# Patient Record
Sex: MALE | Race: BLACK OR AFRICAN AMERICAN | NOT HISPANIC OR LATINO | Employment: UNEMPLOYED | URBAN - METROPOLITAN AREA
[De-identification: names, ages, dates, MRNs, and addresses within clinical notes are randomized per-mention and may not be internally consistent; named-entity substitution may affect disease eponyms.]

---

## 2019-01-22 ENCOUNTER — NURSE TRIAGE (OUTPATIENT)
Dept: PHYSICAL THERAPY | Facility: OTHER | Age: 39
End: 2019-01-22

## 2019-01-22 VITALS
WEIGHT: 165 LBS | HEART RATE: 80 BPM | BODY MASS INDEX: 24.44 KG/M2 | DIASTOLIC BLOOD PRESSURE: 82 MMHG | SYSTOLIC BLOOD PRESSURE: 144 MMHG | HEIGHT: 69 IN

## 2019-01-22 DIAGNOSIS — M54.50 CHRONIC MIDLINE LOW BACK PAIN WITHOUT SCIATICA: Primary | ICD-10-CM

## 2019-01-22 DIAGNOSIS — G89.29 CHRONIC MIDLINE LOW BACK PAIN WITHOUT SCIATICA: Primary | ICD-10-CM

## 2019-01-22 DIAGNOSIS — M54.50 ACUTE BILATERAL LOW BACK PAIN WITHOUT SCIATICA: Primary | ICD-10-CM

## 2019-01-22 PROCEDURE — 99203 OFFICE O/P NEW LOW 30 MIN: CPT | Performed by: ORTHOPAEDIC SURGERY

## 2019-01-22 NOTE — TELEPHONE ENCOUNTER
Reason for Disposition   Is this a chronic condition? Background - Initial Assessment  Clinical complaint: Lower back pain  Date of onset: 6 years overall, this flare up 1 week  Mechanism of injury: Unknown onset of injury  Patient noticed overtime increasing pain with position changes  Flare up this week    Previous Treatment - Previous Treatment  Previous evaluation: Dr Hermelinda Black[facility] orthopedics  Current provider: Just moved - advised to call [address] [place] [address] link to help locate a pcp  Diagnostics: No recent imaging  Previous treatment: No past therapy or intervention  Patient denies pain medication    Protocols used: [facility] COMPREHENSIVE SPINE PROGRAM PROTOCOL    RN provided patient an overview of the [facility] Program including: triage assessment, physical therapy, and additional specialist referral options based on symptoms and chronicity  RN explained that Comprehensive Spine program is physical therapy based with the goal of getting the patient scheduled in 24 - 48 hrs  Further explained that we schedule patients for a consultation with one of our advanced spine physical therapists for evaluation which may include treatment  These therapists have their doctorate in PTx  If needed, a telemed visit could occur at the same time of this consultation if muscle relaxers or a higher dose NSAID is needed  The goal is to get patients treated as quickly as possible so they can return to their normal activities  Research has shown great results when starting physical therapy sooner than later which helps reduce imaging and costs to patients  Discussed options for both PT and Physiatry with Patient  Patient would like to start with PT only first and be referred from there only if necessary

## 2019-01-22 NOTE — PROGRESS NOTES
Assessment/Plan:  1  Chronic midline low back pain without sciatica       Regi Miranda has 1 week of low back pain without radicular symptoms  I did give him the referral to the [facility] to begin physical therapy and further workup if necessary  He may follow up with me as needed  Subjective:   Joel Jorge is a 34 y o  male who presents for evaluation for low back pain  He describes chronic intermittent low back pain for several years but it seems to be increased over the past 1 week  He feels aching and throbbing discomfort across the low back  He denies any radiating pain down the legs  He denies any numbness or weakness  He denies any fall or lifting injury  He states the pain worsens when sitting in the car or standing for peer to time  He is not taking any medication for the pain  Review of Systems   Constitutional: Negative for chills, fever and unexpected weight change  HENT: Negative for hearing loss, nosebleeds and sore throat  Eyes: Negative for pain, redness and visual disturbance  Respiratory: Negative for cough, shortness of breath and wheezing  Cardiovascular: Negative for chest pain, palpitations and leg swelling  Gastrointestinal: Negative for abdominal pain, nausea and vomiting  Endocrine: Negative for polyphagia and polyuria  Genitourinary: Negative for dysuria and hematuria  Musculoskeletal:        See HPI   Skin: Negative for rash and wound  Neurological: Negative for dizziness, numbness and headaches  Psychiatric/Behavioral: Negative for decreased concentration and suicidal ideas  The patient is not nervous/anxious  No past medical history on file  No past surgical history on file  No family history on file  Social History     Occupational History    Not on file       Social History Main Topics    Smoking status: Never Smoker    Smokeless tobacco: Never Used    Alcohol use No    Drug use: No    Sexual activity: Not on file No current outpatient prescriptions on file  No Known Allergies    Objective:  Vitals:    01/22/19 1401   BP: 144/82   Pulse: 80       Back Exam     Tenderness   Back tenderness location: Bilateral lumbar paraspinal tenderness  No midline tenderness  Range of Motion   The patient has normal back ROM  Muscle Strength   Right Quadriceps:  5/5   Left Quadriceps:  5/5   Right Hamstrings:  5/5   Left Hamstrings:  5/5     Tests   Straight leg raise right: negative  Straight leg raise left: negative    Reflexes   Patellar: normal    Other   Toe Walk: normal  Heel Walk: normal  Sensation: normal  Gait: normal   Erythema: no back redness            Physical Exam   Constitutional: He is oriented to person, place, and time  He appears well-developed and well-nourished  HENT:   Head: Normocephalic and atraumatic  Eyes: Pupils are equal, round, and reactive to light  Conjunctivae are normal    Neck: Normal range of motion  Neck supple  Cardiovascular: Normal rate and intact distal pulses  Pulmonary/Chest: Effort normal  No respiratory distress  Musculoskeletal:   As noted in HPI   Neurological: He is alert and oriented to person, place, and time  Skin: Skin is warm and dry  Psychiatric: He has a normal mood and affect  His behavior is normal    Vitals reviewed

## 2019-01-24 ENCOUNTER — EVALUATION (OUTPATIENT)
Dept: PHYSICAL THERAPY | Facility: CLINIC | Age: 39
End: 2019-01-24
Payer: COMMERCIAL

## 2019-01-24 VITALS — TEMPERATURE: 98.5 F | HEART RATE: 66 BPM | SYSTOLIC BLOOD PRESSURE: 135 MMHG | DIASTOLIC BLOOD PRESSURE: 90 MMHG

## 2019-01-24 DIAGNOSIS — M54.50 ACUTE BILATERAL LOW BACK PAIN WITHOUT SCIATICA: Primary | ICD-10-CM

## 2019-01-24 PROCEDURE — 97161 PT EVAL LOW COMPLEX 20 MIN: CPT | Performed by: PHYSICAL THERAPIST

## 2019-01-24 NOTE — PROGRESS NOTES
PT Evaluation     Today's date: 2019  Patient name: Maddison New  : 1980  MRN: [id number]  Referring provider: Timoteo Spear MD  Dx:   Encounter Diagnosis     ICD-10-CM    1  Acute bilateral low back pain without sciatica M54 5 Ambulatory referral to PT spine       Start Time:   Stop Time: 1830  Total time in clinic (min): 45 minutes    Assessment  Assessment details: Maddison New is a 34 y o  male who presents via [facility] with pain, decreased strength, decreased ROM, decreased joint mobility and postural  dysfunction  Due to these impairments, patient has difficulty performing ADL's, lifting/carrying  Patient's clinical presentation is consistent with their referring diagnosis of Acute bilateral low back pain without sciatica  Patient has been educated in home exercise program and plan of care which is oriented around direction specific exercise and stabilization exercises   Patient would benefit from skilled physical therapy services to address their aforementioned functional limitations and progress towards prior level of function and independence with home exercise program      Impairments: abnormal gait, abnormal or restricted ROM, activity intolerance, impaired physical strength, lacks appropriate home exercise program, pain with function, poor posture  and poor body mechanics  Understanding of Dx/Px/POC: good   Prognosis: good    Goals  Short term goals to be accomplished in 3 weeks:  STG 1: Pt will demo independence with postural management  STG 2: Pt will demo I with HEP to maximize progress between therapy sessions  STG 3: Pt will demo L/S AROM < or = min loss throughout to promote improved functional mobility and body mechanics  STG 4: Pt will demo 1/2 MMT grade core stabilizers to improve lumbar stability with functional challenges  STG 5: Pt will reports pain dec freq and intensity 50%    Long term goals to be accomplished in 6 weeks:  LTG 1: Pt will demo good body mech with >75% functional challenges to prevent reinjury  LTG 2: Pt will be able to return to car xfers and household duties pain free as per PLOF  LTG 3: Pt will demo Good strength core stabilizers to promote carryover with body mech and posture    Plan  Plan details:  HEP development, stretching, strengthening, A/AA/PROM, joint mobilizations, posture education, STM/MI as needed to reduce muscle tension, muscle reeducation, PLOC discussed and agreed upon with patient  Patient would benefit from: PT eval and skilled physical therapy  Planned modality interventions: cryotherapy and thermotherapy: hydrocollator packs  Planned therapy interventions: manual therapy, neuromuscular re-education, self care, therapeutic activities, therapeutic exercise and home exercise program  Frequency: 1x week (1-2x/week)  Duration in weeks: 6  Treatment plan discussed with: patient        Subjective Evaluation    History of Present Illness  Mechanism of injury: LBP onset "out of no where" in last several weeks, pain at it's worst with  bending forward  "Getting into the car" is extremely painful  Pt denies use of modalities, use of pain medication  Pt feels his pain has been worsening in last 2 weeks  Prolonged sitting in kitchen furniture (hard surfaces) will inc pain  Pain is central in the lumbar spine, denies any radiating symptoms  Pt reports he is often avoiding any activities that require forward bending including household duties     Quality of life: good    Pain  Current pain ratin  At best pain ratin  At worst pain ratin          Objective     Special Questions  Negative for night pain, disturbed sleep, bladder dysfunction, bowel dysfunction and saddle (S4) numbness    Postural Observations  Seated posture: poor  Standing posture: fair  Correction of posture: makes symptoms better        Neurological Testing     Additional Neurological Details  (-) reflexive or sensory deficits   (-) NT B LEs    Active Range of Motion     Additional Active Range of Motion Details  Lumbar AROM:    Flexion: WNL painful  Extension: Min loss painful  R SGIS WNL pain free  L SGIS WNL pain free    Strength/Myotome Testing     Additional Strength Details  TrvA/Paraspinals Fair to Fair(+)    Tests     Lumbar     Left   Negative crossed SLR and passive SLR  Right   Negative crossed SLR and passive SLR  Additional Tests Details  RICHARD: Dec/B  REIL: Dec/Abolish    PA mobilizations lumbar spine Hypomob L4-S1    General Comments     Lumbar Comments  Function:    Gait: unremarkable  Body mech: Poor squat technique      Flowsheet Rows      Most Recent Value   PT/OT G-Codes   Current Score  60   Projected Score  75   FOTO information reviewed  Yes          Precautions: Standard       Daily Treatment Diary     Manual  1/24            PA mobs L/S 5'                                                                    Exercise Diary  1            Posture Edu 15'            RICHARD 5x            REIL 5x                                                                                                                                                                                                               HEP Edu/inited            Time 20'                Modalities

## 2019-01-30 ENCOUNTER — APPOINTMENT (OUTPATIENT)
Dept: PHYSICAL THERAPY | Facility: CLINIC | Age: 39
End: 2019-01-30
Payer: COMMERCIAL

## 2019-02-21 NOTE — PROGRESS NOTES
Self DC< pt noncompliant  Was Contacted and reported he would "call back to reschedule" never did and never called back after leaving [facility]

## 2020-10-20 ENCOUNTER — OFFICE VISIT (OUTPATIENT)
Dept: FAMILY MEDICINE CLINIC | Facility: CLINIC | Age: 40
End: 2020-10-20
Payer: COMMERCIAL

## 2020-10-20 VITALS
OXYGEN SATURATION: 100 % | TEMPERATURE: 97.2 F | WEIGHT: 161.6 LBS | RESPIRATION RATE: 18 BRPM | HEART RATE: 73 BPM | DIASTOLIC BLOOD PRESSURE: 73 MMHG | BODY MASS INDEX: 24.49 KG/M2 | SYSTOLIC BLOOD PRESSURE: 114 MMHG | HEIGHT: 68 IN

## 2020-10-20 DIAGNOSIS — K59.00 CONSTIPATION, UNSPECIFIED CONSTIPATION TYPE: ICD-10-CM

## 2020-10-20 DIAGNOSIS — E78.5 HYPERLIPIDEMIA, UNSPECIFIED HYPERLIPIDEMIA TYPE: ICD-10-CM

## 2020-10-20 DIAGNOSIS — Z23 ENCOUNTER FOR IMMUNIZATION: ICD-10-CM

## 2020-10-20 DIAGNOSIS — Z00.00 ENCOUNTER FOR ANNUAL PHYSICAL EXAM: Primary | ICD-10-CM

## 2020-10-20 PROCEDURE — 99386 PREV VISIT NEW AGE 40-64: CPT | Performed by: FAMILY MEDICINE

## 2020-10-20 PROCEDURE — 1036F TOBACCO NON-USER: CPT | Performed by: FAMILY MEDICINE

## 2020-10-20 PROCEDURE — 90471 IMMUNIZATION ADMIN: CPT | Performed by: FAMILY MEDICINE

## 2020-10-20 PROCEDURE — 90682 RIV4 VACC RECOMBINANT DNA IM: CPT | Performed by: FAMILY MEDICINE
